# Patient Record
Sex: MALE | Race: WHITE | NOT HISPANIC OR LATINO | Employment: OTHER | URBAN - METROPOLITAN AREA
[De-identification: names, ages, dates, MRNs, and addresses within clinical notes are randomized per-mention and may not be internally consistent; named-entity substitution may affect disease eponyms.]

---

## 2017-04-19 ENCOUNTER — HISTORICAL (OUTPATIENT)
Dept: LAB | Facility: HOSPITAL | Age: 82
End: 2017-04-19

## 2017-04-20 ENCOUNTER — HISTORICAL (OUTPATIENT)
Dept: RADIATION THERAPY | Facility: HOSPITAL | Age: 82
End: 2017-04-20

## 2017-04-25 ENCOUNTER — HISTORICAL (OUTPATIENT)
Dept: ADMINISTRATIVE | Facility: HOSPITAL | Age: 82
End: 2017-04-25

## 2017-05-23 ENCOUNTER — HISTORICAL (OUTPATIENT)
Dept: LAB | Facility: HOSPITAL | Age: 82
End: 2017-05-23

## 2017-05-23 LAB
ABS NEUT (OLG): 3.45 X10(3)/MCL (ref 2.1–9.2)
APPEARANCE, UA: CLEAR
APTT PPP: 34.5 SECOND(S) (ref 20.6–36)
BACTERIA SPEC CULT: NORMAL /HPF
BASOPHILS # BLD AUTO: 0 X10(3)/MCL (ref 0–0.2)
BASOPHILS NFR BLD AUTO: 0 %
BILIRUB UR QL STRIP: NEGATIVE
BUN SERPL-MCNC: 14 MG/DL (ref 7–18)
CALCIUM SERPL-MCNC: 8.7 MG/DL (ref 8.5–10.1)
CHLORIDE SERPL-SCNC: 104 MMOL/L (ref 98–107)
CO2 SERPL-SCNC: 29 MMOL/L (ref 21–32)
COLOR UR: YELLOW
CREAT SERPL-MCNC: 1.09 MG/DL (ref 0.7–1.3)
EOSINOPHIL # BLD AUTO: 0.3 X10(3)/MCL (ref 0–0.9)
EOSINOPHIL NFR BLD AUTO: 5 %
ERYTHROCYTE [DISTWIDTH] IN BLOOD BY AUTOMATED COUNT: 13.1 % (ref 11.5–17)
GLUCOSE (UA): NEGATIVE
GLUCOSE SERPL-MCNC: 93 MG/DL (ref 74–106)
HCT VFR BLD AUTO: 42.9 % (ref 42–52)
HGB BLD-MCNC: 14.2 GM/DL (ref 14–18)
HGB UR QL STRIP: NEGATIVE
INR PPP: 1.14 (ref 0–1.27)
KETONES UR QL STRIP: NEGATIVE
LEUKOCYTE ESTERASE UR QL STRIP: NEGATIVE
LYMPHOCYTES # BLD AUTO: 1.5 X10(3)/MCL (ref 0.6–4.6)
LYMPHOCYTES NFR BLD AUTO: 25 %
MCH RBC QN AUTO: 31.3 PG (ref 27–31)
MCHC RBC AUTO-ENTMCNC: 33.1 GM/DL (ref 33–36)
MCV RBC AUTO: 94.7 FL (ref 80–94)
MONOCYTES # BLD AUTO: 0.7 X10(3)/MCL (ref 0.1–1.3)
MONOCYTES NFR BLD AUTO: 12 %
MRSA SCREEN BY PCR: NEGATIVE
NEUTROPHILS # BLD AUTO: 3.45 X10(3)/MCL (ref 2.1–9.2)
NEUTROPHILS NFR BLD AUTO: 58 %
NITRITE UR QL STRIP: NEGATIVE
PH UR STRIP: 5 [PH] (ref 5–9)
PLATELET # BLD AUTO: 134 X10(3)/MCL (ref 130–400)
PMV BLD AUTO: 11.4 FL (ref 9.4–12.4)
POTASSIUM SERPL-SCNC: 4.8 MMOL/L (ref 3.5–5.1)
PROT UR QL STRIP: NEGATIVE
PROTHROMBIN TIME: 14.4 SECOND(S) (ref 12.1–14.2)
RBC # BLD AUTO: 4.53 X10(6)/MCL (ref 4.7–6.1)
RBC #/AREA URNS HPF: NORMAL /[HPF]
SODIUM SERPL-SCNC: 141 MMOL/L (ref 136–145)
SP GR UR STRIP: 1.01 (ref 1–1.03)
SQUAMOUS EPITHELIAL, UA: NORMAL
UROBILINOGEN UR STRIP-ACNC: 0.2
WBC # SPEC AUTO: 6 X10(3)/MCL (ref 4.5–11.5)
WBC #/AREA URNS HPF: NORMAL /HPF

## 2017-06-09 ENCOUNTER — HISTORICAL (OUTPATIENT)
Dept: ADMINISTRATIVE | Facility: HOSPITAL | Age: 82
End: 2017-06-09

## 2020-02-13 ENCOUNTER — HOSPITAL ENCOUNTER (OUTPATIENT)
Dept: MEDSURG UNIT | Facility: HOSPITAL | Age: 85
End: 2020-02-14
Attending: INTERNAL MEDICINE | Admitting: INTERNAL MEDICINE

## 2020-02-13 LAB
ABS NEUT (OLG): 4.96 X10(3)/MCL (ref 2.1–9.2)
ALBUMIN SERPL-MCNC: 3.2 GM/DL (ref 3.4–5)
ALBUMIN/GLOB SERPL: 1 RATIO (ref 1.1–2)
ALP SERPL-CCNC: 137 UNIT/L (ref 50–136)
ALT SERPL-CCNC: 28 UNIT/L (ref 12–78)
APTT PPP: 33 SECOND(S) (ref 24.2–33.9)
AST SERPL-CCNC: 27 UNIT/L (ref 15–37)
BASOPHILS # BLD AUTO: 0 X10(3)/MCL (ref 0–0.2)
BASOPHILS NFR BLD AUTO: 0 %
BILIRUB SERPL-MCNC: 0.3 MG/DL (ref 0.2–1)
BILIRUBIN DIRECT+TOT PNL SERPL-MCNC: 0.1 MG/DL (ref 0–0.5)
BILIRUBIN DIRECT+TOT PNL SERPL-MCNC: 0.2 MG/DL (ref 0–0.8)
BNP BLD-MCNC: 45 PG/ML (ref 0–100)
BUN SERPL-MCNC: 25 MG/DL (ref 7–18)
CALCIUM SERPL-MCNC: 8.4 MG/DL (ref 8.5–10.1)
CHLORIDE SERPL-SCNC: 106 MMOL/L (ref 98–107)
CO2 SERPL-SCNC: 27 MMOL/L (ref 21–32)
CREAT SERPL-MCNC: 1.09 MG/DL (ref 0.7–1.3)
EOSINOPHIL # BLD AUTO: 0.3 X10(3)/MCL (ref 0–0.9)
EOSINOPHIL NFR BLD AUTO: 4 %
ERYTHROCYTE [DISTWIDTH] IN BLOOD BY AUTOMATED COUNT: 12.3 % (ref 11.5–17)
GLOBULIN SER-MCNC: 3.1 GM/DL (ref 2.4–3.5)
GLUCOSE SERPL-MCNC: 115 MG/DL (ref 74–106)
HCT VFR BLD AUTO: 39.5 % (ref 42–52)
HGB BLD-MCNC: 12.1 GM/DL (ref 14–18)
INR PPP: 1.2 (ref 0–1.3)
LYMPHOCYTES # BLD AUTO: 1.7 X10(3)/MCL (ref 0.6–4.6)
LYMPHOCYTES NFR BLD AUTO: 22 %
MCH RBC QN AUTO: 31.3 PG (ref 27–31)
MCHC RBC AUTO-ENTMCNC: 30.6 GM/DL (ref 33–36)
MCV RBC AUTO: 102.3 FL (ref 80–94)
MONOCYTES # BLD AUTO: 1 X10(3)/MCL (ref 0.1–1.3)
MONOCYTES NFR BLD AUTO: 12 %
NEUTROPHILS # BLD AUTO: 4.96 X10(3)/MCL (ref 2.1–9.2)
NEUTROPHILS NFR BLD AUTO: 62 %
PLATELET # BLD AUTO: 135 X10(3)/MCL (ref 130–400)
PMV BLD AUTO: 10.6 FL (ref 9.4–12.4)
POC TROPONIN: 0 NG/ML (ref 0–0.08)
POTASSIUM SERPL-SCNC: 5 MMOL/L (ref 3.5–5.1)
PROT SERPL-MCNC: 6.3 GM/DL (ref 6.4–8.2)
PROTHROMBIN TIME: 14.2 SECOND(S) (ref 12–14)
RBC # BLD AUTO: 3.86 X10(6)/MCL (ref 4.7–6.1)
SODIUM SERPL-SCNC: 137 MMOL/L (ref 136–145)
TROPONIN I SERPL-MCNC: <0.02 NG/ML (ref 0.02–0.49)
WBC # SPEC AUTO: 8 X10(3)/MCL (ref 4.5–11.5)

## 2020-02-14 LAB
ABS NEUT (OLG): 4.72 X10(3)/MCL (ref 2.1–9.2)
APPEARANCE, UA: CLEAR
APTT PPP: 32.9 SECOND(S) (ref 24.8–36.9)
BACTERIA SPEC CULT: NORMAL /HPF
BASOPHILS # BLD AUTO: 0 X10(3)/MCL (ref 0–0.2)
BASOPHILS NFR BLD AUTO: 0 %
BILIRUB UR QL STRIP: NEGATIVE
CHOLEST SERPL-MCNC: 118 MG/DL (ref 0–200)
CHOLEST/HDLC SERPL: 4.1 {RATIO} (ref 0–5)
CK MB SERPL-MCNC: 1 NG/ML (ref 0.5–3.6)
CK MB SERPL-MCNC: 1.1 NG/ML (ref 0.5–3.6)
CK SERPL-CCNC: 78 UNIT/L (ref 39–308)
CK SERPL-CCNC: 92 UNIT/L (ref 39–308)
COLOR UR: YELLOW
EOSINOPHIL # BLD AUTO: 0.2 X10(3)/MCL (ref 0–0.9)
EOSINOPHIL NFR BLD AUTO: 3 %
ERYTHROCYTE [DISTWIDTH] IN BLOOD BY AUTOMATED COUNT: 12.4 % (ref 11.5–17)
GLUCOSE (UA): NEGATIVE
HCT VFR BLD AUTO: 37.1 % (ref 42–52)
HDLC SERPL-MCNC: 29 MG/DL (ref 35–60)
HGB BLD-MCNC: 11.8 GM/DL (ref 14–18)
HGB UR QL STRIP: NEGATIVE
INR PPP: 1.2 (ref 0–1.3)
KETONES UR QL STRIP: NEGATIVE
LDLC SERPL CALC-MCNC: 70 MG/DL (ref 0–129)
LEUKOCYTE ESTERASE UR QL STRIP: NEGATIVE
LYMPHOCYTES # BLD AUTO: 1.6 X10(3)/MCL (ref 0.6–4.6)
LYMPHOCYTES NFR BLD AUTO: 22 %
MCH RBC QN AUTO: 31.6 PG (ref 27–31)
MCHC RBC AUTO-ENTMCNC: 31.8 GM/DL (ref 33–36)
MCV RBC AUTO: 99.5 FL (ref 80–94)
MONOCYTES # BLD AUTO: 0.8 X10(3)/MCL (ref 0.1–1.3)
MONOCYTES NFR BLD AUTO: 11 %
NEUTROPHILS # BLD AUTO: 4.72 X10(3)/MCL (ref 2.1–9.2)
NEUTROPHILS NFR BLD AUTO: 63 %
NITRITE UR QL STRIP: NEGATIVE
PH UR STRIP: 5.5 [PH] (ref 5–9)
PLATELET # BLD AUTO: 128 X10(3)/MCL (ref 130–400)
PMV BLD AUTO: 10.8 FL (ref 9.4–12.4)
PROT UR QL STRIP: NEGATIVE
PROTHROMBIN TIME: 14.2 SECOND(S) (ref 12–14)
RBC # BLD AUTO: 3.73 X10(6)/MCL (ref 4.7–6.1)
RBC #/AREA URNS HPF: NORMAL /[HPF]
SP GR UR STRIP: 1.02 (ref 1–1.03)
SQUAMOUS EPITHELIAL, UA: NORMAL
TRIGL SERPL-MCNC: 95 MG/DL (ref 30–150)
TROPONIN I SERPL-MCNC: 0.02 NG/ML (ref 0.02–0.49)
TROPONIN I SERPL-MCNC: 0.02 NG/ML (ref 0.02–0.49)
TROPONIN I SERPL-MCNC: <0.02 NG/ML (ref 0.02–0.49)
TROPONIN I SERPL-MCNC: <0.02 NG/ML (ref 0.02–0.49)
UROBILINOGEN UR STRIP-ACNC: 0.2
VLDLC SERPL CALC-MCNC: 19 MG/DL
WBC # SPEC AUTO: 7.5 X10(3)/MCL (ref 4.5–11.5)
WBC #/AREA URNS HPF: NORMAL /HPF

## 2021-03-03 LAB
ABS NEUT (OLG): 5.09 X10(3)/MCL (ref 2.1–9.2)
BASOPHILS # BLD AUTO: 0 X10(3)/MCL (ref 0–0.2)
BASOPHILS NFR BLD AUTO: 0 %
BUN SERPL-MCNC: 15.9 MG/DL (ref 8.4–25.7)
CALCIUM SERPL-MCNC: 9.3 MG/DL (ref 8.8–10)
CHLORIDE SERPL-SCNC: 104 MMOL/L (ref 98–107)
CO2 SERPL-SCNC: 30 MMOL/L (ref 23–31)
CREAT SERPL-MCNC: 0.91 MG/DL (ref 0.73–1.18)
CREAT/UREA NIT SERPL: 17
EOSINOPHIL # BLD AUTO: 0.2 X10(3)/MCL (ref 0–0.9)
EOSINOPHIL NFR BLD AUTO: 3 %
ERYTHROCYTE [DISTWIDTH] IN BLOOD BY AUTOMATED COUNT: 13.4 % (ref 11.5–17)
GLUCOSE SERPL-MCNC: 104 MG/DL (ref 82–115)
HCT VFR BLD AUTO: 41.3 % (ref 42–52)
HGB BLD-MCNC: 12.7 GM/DL (ref 14–18)
LYMPHOCYTES # BLD AUTO: 1.5 X10(3)/MCL (ref 0.6–4.6)
LYMPHOCYTES NFR BLD AUTO: 20 %
MCH RBC QN AUTO: 31 PG (ref 27–31)
MCHC RBC AUTO-ENTMCNC: 30.8 GM/DL (ref 33–36)
MCV RBC AUTO: 100.7 FL (ref 80–94)
MONOCYTES # BLD AUTO: 0.7 X10(3)/MCL (ref 0.1–1.3)
MONOCYTES NFR BLD AUTO: 10 %
NEUTROPHILS # BLD AUTO: 5.09 X10(3)/MCL (ref 2.1–9.2)
NEUTROPHILS NFR BLD AUTO: 67 %
PLATELET # BLD AUTO: 188 X10(3)/MCL (ref 130–400)
PMV BLD AUTO: 11.4 FL (ref 9.4–12.4)
POTASSIUM SERPL-SCNC: 4.6 MMOL/L (ref 3.5–5.1)
RBC # BLD AUTO: 4.1 X10(6)/MCL (ref 4.7–6.1)
SARS-COV-2 AG RESP QL IA.RAPID: NEGATIVE
SODIUM SERPL-SCNC: 140 MMOL/L (ref 136–145)
WBC # SPEC AUTO: 7.6 X10(3)/MCL (ref 4.5–11.5)

## 2021-03-04 ENCOUNTER — HISTORICAL (OUTPATIENT)
Dept: ADMINISTRATIVE | Facility: HOSPITAL | Age: 86
End: 2021-03-04

## 2022-04-10 ENCOUNTER — HISTORICAL (OUTPATIENT)
Dept: ADMINISTRATIVE | Facility: HOSPITAL | Age: 87
End: 2022-04-10

## 2022-04-26 VITALS
WEIGHT: 154.56 LBS | DIASTOLIC BLOOD PRESSURE: 70 MMHG | BODY MASS INDEX: 20.49 KG/M2 | HEIGHT: 73 IN | SYSTOLIC BLOOD PRESSURE: 132 MMHG

## 2022-04-30 NOTE — OP NOTE
Patient:   Carmelo Cornejo Sr            MRN: [id number]            FIN: [id number]               Age:   85 years     Sex:  Male     :  1935   Associated Diagnoses:   None   Author:   Yobani Quinn MD      Operative Note   DATE OF OPERATION:  2021    PREOPERATIVE DIAGNOSIS  1.  Right VIM DBS IPG end-of-life  2.  Medically intractable essential tremor    POSTOPERATIVE DIAGNOSIS:  1.  Right VIM DBS IPG end-of-life  2.  Medically intractable essential tremor    SURGEON:  Yobani Quinn M.D.   ASSISTANT: DANIELLE Foster    PROCEDURE:  1. Removal of right VIM DBS IPG  2. Insertion of right VIM DBS IPG (OneWed (Formerly [employer]tronic Activa SC)  3. Interrogation an initial programming of DBS IPG    ANESTHESIA:  General endotracheal    BLOOD LOSS:  Negligible    SPECIMEN(s):  IPG for ID only    COMPLICATIONS:  None    HISTORY:  Carmelo Cornejo is a 85-year-old gentleman who is approximately 4 years ago underwent right VIM DBS electrode implantation.  He did well with respect to his tremor control, but interrogation recently showed the IPG end-of-life.  Options were discussed and removal/replacement was elected.  The patient understood and accepted the nature of this surgery as well as its attendant risks.    FINDINGS:  There were no untoward findings.  The IPG was replaced without incident.  Interrogation confirmed normal impedances at all contacts.  The IPG was reprogrammed to the preoperative settings.    PROCEDURE IN DETAIL:  After endotracheal intubation and induction of general anesthesia, the patient's head was placed on a donut and turned away from the planned operative side. The patient received an appropriate dose of intravenous antibiotic prior to the start of procedure. The left anterior chest wall was prepped and draped in the usual fashion. The previous incision was marked out and infiltrated with local anesthetic containing epinephrine. The incision was carried down through the skin and subcutaneous  tissues with a knife. Sharp dissection was used to dissect around the existing generator and tie-down sutures. The generator was removed from the pocket and disconnected from the extension leads. The extension leads were wiped clean and then inserted into the new generator which was placed in the pocket and checked for impedances which were satisfactory. The wound was then irrigated copiously with antibiotic irrigationThe generator was secured to the fascia with a single 3-0 Prolene suture. The wound was then closed without any tension with 2-0 Vicryl for the subcutaneous layer and Dermabond glue for the skin edges. The patient was taken to the post anesthetic care unit in satisfactory condition with correct sponge and needle counts.

## 2022-04-30 NOTE — ED PROVIDER NOTES
"   Patient:   Carmelo Cornejo Sr            MRN: [id number]            FIN: [id number]               Age:   84 years     Sex:  Male     :  1935   Associated Diagnoses:   Chest pain; Unstable angina   Author:   Shahida Cullen      Basic Information   Time seen: Date & time 2020 23:27:00.   History source: Patient, significant other.   Arrival mode: Air ambulance.   History limitation: None.   Additional information: Patient's physician(s): Omari Monge MD      History of Present Illness   The patient presents with   83 y/o CM with a history of AAA, Alzheimer's, CABG, CPAP, HLD, MI, and HTN presents to the ED via [facility] for chest pain that started around . Pt's wife states that the Pt was complaining of indigestion so she went to get him something for it and when she returned the Pt was saying "I think I am having a heart attack". Pt's wife states that the Pt was pale and diaphoretic. Pt's pain was reduced with NTG..  The onset was 2020 20:10:00 .  The course/duration of symptoms is constant.  Location: Central chest epigastric. Radiating pain: none. The character of symptoms is pressure.  The degree at onset was minimal.  The degree at maximum was moderate.  The degree at present is minimal.  The exacerbating factor is none.  The relieving factor is nitroglycerin.  Risk factors consist of hypertension, smoking (Former) and hyperlipidemia.  Prior episodes: MI.  Therapy today Nitroglycerin and Aspirin.  Associated symptoms: diaphoresis.        Review of Systems   Constitutional symptoms:  Negative except as documented in HPI, Pale and diaphoretic   Skin symptoms:  Negative except as documented in HPI   Eye symptoms:  Negative except as documented in HPI.   ENMT symptoms:  Negative except as documented in HPI.   Respiratory symptoms:  Negative except as documented in HPI   Cardiovascular symptoms:  Chest pain, central, moderate pain   Gastrointestinal symptoms:  Negative except as " documented in HPI.   Musculoskeletal symptoms:  Negative except as documented in HPI.   Neurologic symptoms:  Negative except as documented in HPI.   Psychiatric symptoms:  Negative except as documented in HPI             Additional review of systems information: All other systems reviewed and otherwise negative.      Health Status   Allergies:    Allergic Reactions (All)  Low  [place]- Stomach issues.  Severity Not Documented  Carisoprodol- Insomnia.  Clemastine fumarate- Stomach pain and testicle pain.  Codeine- Hallucinations and itching.  Floxin- Insomnia.  Iodine- Rash and tiara - difficulty in breathing.  Ionic iodinated contrast media- Unknown.  Levofloxacin- Insomnia.  Morphine- Hallucinations.  Penicillin allergy- Pain radiating to lower abdomen and testicle pain.  Prednisone- Stomach pain and testicle pain.  Prevacid- Stomach pain.  PriLOSEC- Bleeding nose.  Tetracycline- Indigestion.  Vancomycin- Unknown..   Medications:  (Selected)   Inpatient Medications  Ordered  Vancomycin 1g/D5W 250 mL: 1 gm, IV Piggyback, Once, Infuse over: 120 minute(s), first dose 12/02/16 11:01:00 CST, stop date 12/02/16 11:01:00 CST, STAT, 24  citric acid-sodium citrate 334 mg-500 mg/5 mL oral solution: 30 mL, form: Soln, Oral, Once, first dose 04/25/17 9:00:00 CDT, stop date 04/25/17 9:00:00 CDT, if obese (BMI>30) or has GERD or diabetes or is not NPO  Pending Complete  midazolam: 2 mg, form: Injection, IV Push, q5min, Order duration: 2 dose(s), first dose 10/11/16 6:05:00 CDT, stop date 10/11/16 6:14:00 CDT, (up to 5 mg for moderate anxiety), 30,025  Prescriptions  Prescribed  memantine 10 mg oral tablet: 10 mg = 1 tab(s), Oral, BID, # 180 tab(s), 4 Refill(s), Pharmacy: [facility] [id number]  Documented Medications  Documented  Tylenol 325 mg oral tablet: 650 mg = 2 tab(s), Oral, q4hr, PRN PRN fever, 0 Refill(s)  aspirin 81 mg oral tablet: 81 mg = 1 tab(s), Oral, Daily, 0 Refill(s)  atorvastatin 10 mg oral tablet: 5 mg = 0.5  tab(s), Oral, qPM  carvedilol 3.125 mg oral tablet: 3.125 mg = 1 tab(s), Oral, BID  terazosin 1 mg oral capsule: 1 mg = 1 cap(s), Oral, Once a day (at bedtime)  tiZANidine 4 mg oral tablet: 4 mg = 1 tab(s), Oral, BID.      Past Medical/ Family/ Social History   Medical history:    Active  Sleep apnea ([id number])  Resolved  Atrial fibrillation ([id number]):  Resolved.  AAA (abdominal aortic aneurysm) ([id number]):  Resolved.  Diverticulitis ([id number]):  Resolved.  Inguinal hernia ([id number]):  Resolved.  Comments:  5/23/2017 CDT 15:05 HADLEY Diaz RN, Ias Marlow  bilateral  Kidney stones ([id number]):  Resolved..   Surgical history:    Craniotomy Deep Brain Stim Stealth (.) on 6/13/2017 at 82 Years.  Comments:  6/13/2017 11:35 SONALT - Kristopher WYATT, Edwina LUNA  auto-populated from documented surgical case  DBS Pulse Generator Insertion (Anterior, .) on 11/26/2016 at 81 Years.  Comments:  11/26/2016 12:18 Dawn Coyle RN  auto-populated from documented surgical case  DBS Pulse Generator Insertion (Right) on 10/25/2016 at 81 Years.  Comments:  10/25/2016 8:47 SONALT - Sharon Massey RN  auto-populated from documented surgical case  Craniotomy Deep Brain Stim Stealth (Right) on 10/14/2016 at 81 Years.  Comments:  10/14/2016 10:07 HADLEY - Shanita Odonnell RN  auto-populated from documented surgical case  Procedure Cancelled Intraoperatively on 10/11/2016 at 81 Years.  Comments:  10/11/2016 12:18 SONALT - YOSELIN Cardenas Sandy  auto-populated from documented surgical case  Fiducial Bone [place] [employer]/PACU (.) on 10/4/2016 at 81 Years.  Comments:  10/4/2016 7:57 CDT - Jt WYATT , Edwina LUNA  auto-populated from documented surgical case  CABG x 6 in 2005 at 70 Years.  Back Sx.  Hernia repair.  Cardiac stent.  right carpel tunnel.  bilateral shoulder surgeries.  AAA stent..   Family history:    Acute myocardial infarction.  Mother  Cardiac  arrhythmia.  Father  .   Social history: Alcohol use: Denies, Tobacco use: Former, Drug use: Denies.      Physical Examination               Vital Signs   Vital Signs   2/13/2020 22:30 CST      Peripheral Pulse Rate     57 bpm  LOW                             Heart Rate Monitored      57 bpm  LOW                             Respiratory Rate          17 br/min                             SpO2                      96 %                             Systolic Blood Pressure   90 mmHg                             Diastolic Blood Pressure  51 mmHg  LOW                             Mean Arterial Pressure, Cuff              64 mmHg    2/13/2020 22:15 CST      Peripheral Pulse Rate     57 bpm  LOW                             Heart Rate Monitored      58 bpm  LOW                             Respiratory Rate          12 br/min                             SpO2                      96 %                             Saturation Probe Site     Hand, left                             Oxygen Therapy            Room air                             Systolic Blood Pressure   104 mmHg                             Diastolic Blood Pressure  57 mmHg  LOW                             Mean Arterial Pressure, Cuff              73 mmHg                             Blood Pressure Location   Right arm                             Blood Pressure Cuff Size  Adult    2/13/2020 22:02 CST      Temperature Oral          37.0 DegC                             Temperature Oral (calculated)             98.60 DegF                             Peripheral Pulse Rate     59 bpm  LOW                             Respiratory Rate          16 br/min                             Systolic Blood Pressure   104 mmHg                             Diastolic Blood Pressure  57 mmHg  LOW  .   Measurements   2/13/2020 22:02 CST      Weight Dosing             77.5 kg                             Weight Measured and Calculated in Lbs     170.86 lb                             Weight  Estimated          77.5 kg  .   Basic Oxygen Information   2/13/2020 22:30 CST      SpO2                      96 %    2/13/2020 22:15 CST      SpO2                      96 %                             Oxygen Therapy            Room air  .   General:  Alert, no acute distress, well appearing, conversant   Neurological:  Alert and oriented to person, place, time, and situation, No focal neurological deficit observed, CN II-XII intact, normal sensory observed, normal motor observed, normal speech observed, normal coordination observed.    Skin:  Warm, dry, intact   Head:  Normocephalic, atraumatic   Neck:  Supple, trachea midline   Eye:  Pupils are equal, round and reactive to light, extraocular movements are intact, normal conjunctiva   Ears, nose, mouth and throat:  Oral mucosa moist.   Cardiovascular:  Regular rate and rhythm, No murmur, Normal peripheral perfusion, No edema   Respiratory:  Lungs are clear to auscultation, respirations are non-labored, breath sounds are equal, Symmetrical chest wall expansion.    Chest wall:  No tenderness.   Musculoskeletal:  Normal ROM, normal strength, no tenderness, no swelling, no deformity.    Gastrointestinal:  Soft, Nontender, Non distended   Psychiatric:  Cooperative, appropriate mood & affect, normal judgment.       Medical Decision Making   Documents reviewed:  Emergency department nurses' notes.   Orders  Launch Order Profile (Selected)   Inpatient Orders  Ordered  Troponin-I: Timed collect, 02/13/20 22:17:00 CST, Blood, q6hr for 3 dose(s), Stop date 02/14/20 16:59:00 CST, Lab Collect, Print Label By Order Location, 02/13/20 23:00:00 CST  Ordered (Collected)  POC BNP iSTAT request: BLOOD, STAT collect, Collected, 02/13/20 22:22:57 CST, Stop date 02/13/20 22:22:00 CST, Lab Collect, Print Label By Order Location  POC iSTAT ER Troponin request: BLOOD, STAT collect, Collected, 02/13/20 22:22:57 CST, Stop date 02/13/20 22:22:00 CST, Lab Collect, Print Label By Order  Location  Ordered (Dispatched)  Urinalysis with microscopic a reflex to culture: Stat collect, Urine, 02/13/20 22:18:00 CST, Stop date 02/13/20 22:18:00 CST, Nurse collect  Completed  Automated Diff: STAT collect, 02/13/20 22:22:00 CST, Blood, Collected, Stop date 02/13/20 22:22:00 CST, Lab Collect, 02/13/20 22:18:00 CST  CBC w/ Auto Diff: STAT collect, 02/13/20 22:22:57 CST, BLOOD, Collected, Stop date 02/13/20 22:22:00 CST, Lab Collect  CMP: STAT collect, 02/13/20 22:22:57 CST, BLOOD, Collected, Stop date 02/13/20 22:22:00 CST, Lab Collect  CXR 1 View: Stat, 02/13/20 22:17:00 CST, Chest Pain, None, Patient Bed, Patient Has IV?, Rad Type, Not Scheduled, 02/13/20 22:17:00 CST  Estimated Glomerular Filtration Rate: STAT collect, 02/13/20 22:22:00 CST, Blood, Collected, Stop date 02/13/20 22:22:00 CST, Lab Collect, 02/13/20 22:18:00 CST  POC BNP iSTAT: Blood, Stat collect, Collected, 02/13/20 22:44:36 CST  POC Istat ER Troponin: Blood, Stat collect, Collected, 02/13/20 22:43:32 CST  PTT: STAT collect, 02/13/20 22:22:57 CST, BLOOD, Collected, Stop date 02/13/20 22:22:00 CST, Lab Collect  Prothrombin Time: STAT collect, 02/13/20 22:22:57 CST, BLOOD, Collected, Stop date 02/13/20 22:22:00 CST, Lab Collect  aspirin 325 mg oral tablet: 325 mg, form: Tab, Oral, Once, first dose 02/13/20 23:35:00 CST, stop date 02/13/20 23:35:00 CST, STAT, 4 chew tab = 5 grains  aspirin: 325 mg, 1 tab(s), Tab, N/A, Once, Stop date 02/13/20 23:33:23 CST, Physician Stop, 02/13/20 23:33:23 CST  nitroglycerin 2% transdermal ointment: 1 in, form: Ointment, TOP, Once, first dose 02/13/20 22:18:00 CST, stop date 02/13/20 22:18:00 CST, STAT.   Electrocardiogram:  Time 2/13/2020 22:03:00, rate 58, normal sinus rhythm, No ST-T changes, no ectopy, EP Interp, QRS interval Right bundle branch block.    Results review:  Lab results : Lab View   2/13/2020 22:44 CST      POC BNP iSTAT             45 pg/mL    2/13/2020 22:43 CST      POC Troponin               0.00 ng/mL    2/13/2020 22:22 CST      Sodium Lvl                137 mmol/L                             Potassium Lvl             5.0 mmol/L                             Chloride                  106 mmol/L                             CO2                       27.0 mmol/L                             Calcium Lvl               8.4 mg/dL  LOW                             Glucose Lvl               115 mg/dL  HI                             BUN                       25.0 mg/dL  HI                             Creatinine                1.09 mg/dL                             eGFR-AA                   >60 mL/min/1.73 m2  NA                             eGFR-LALO                  >60 mL/min/1.73 m2  NA                             Bili Total                0.3 mg/dL                             Bili Direct               0.10 mg/dL                             Bili Indirect             0.20 mg/dL                             AST                       27 unit/L                             ALT                       28 unit/L                             Alk Phos                  137 unit/L  HI                             Total Protein             6.3 gm/dL  LOW                             Albumin Lvl               3.20 gm/dL  LOW                             Globulin                  3.10 gm/dL                             A/G Ratio                 1.0 ratio  LOW                             Troponin-I                <0.02 ng/mL  LOW                             PT                        14.2 second(s)  HI                             INR                       1.2                             PTT                       33.0 second(s)                             WBC                       8.0 x10(3)/mcL                             RBC                       3.86 x10(6)/mcL  LOW                             Hgb                       12.1 gm/dL  LOW                             Hct                       39.5 %  LOW                             Platelet                   135 x10(3)/mcL                             MCV                       102.3 fL  HI                             MCH                       31.3 pg  HI                             MCHC                      30.6 gm/dL  LOW                             RDW                       12.3 %                             MPV                       10.6 fL                             Abs Neut                  4.96 x10(3)/mcL                             Neutro Auto               62 %  NA                             Lymph Auto                22 %  NA                             Mono Auto                 12 %  NA                             Eos Auto                  4 %  NA                             Abs Eos                   0.3 x10(3)/mcL                             Basophil Auto             0 %  NA                             Abs Neutro                4.96 x10(3)/mcL                             Abs Lymph                 1.7 x10(3)/mcL                             Abs Mono                  1.0 x10(3)/mcL                             Abs Baso                  0.0 x10(3)/mcL    .   Radiology results:  Rad Results (ST)  < 12 hrs   Accession: [id number]  Order: XR Chest 1 View  Report Dt/Tm: 02/13/2020 22:46  Report:   Chest one view     Clinical history is chest pain     This compared to a previous study from 4/19/2017     There is mild blunting of the left costophrenic angle stable from the  previous exam. Pacing battery is in place. Heart size is within normal  limits. There is vascular calcification noted.     IMPRESSION: Changes as described above, stable from the previous study    .      Reexamination/ Reevaluation   Vital signs   Basic Oxygen Information   2/13/2020 22:30 CST      SpO2                      96 %    2/13/2020 22:15 CST      SpO2                      96 %                             Oxygen Therapy            Room air        Impression and Plan   Diagnosis   Chest pain ([id number] R07.9)   Unstable  angina (INF76-VH I20.0)      Calls-Consults   -  2/13/2020 23:47:00 , consult, recommends Rhys with [facility] recommends admit.    Plan   Condition: Stable.    Disposition: Admit.    Counseled: Patient, Family, Regarding diagnosis, Regarding diagnostic results, Regarding treatment plan, Patient indicated understanding of instructions, Family understood.    Notes: I, Shahida Cullen, acted solely as a scribe for and in the presence of Dr. Lorenzo who performed the service. .       Addendum      Teaching-Supervisory Addendum-Brief   Notes: I, Dr. Lorenzo, personally performed the services described in this documentation as scribed in my presence and it is both accurate and complete..

## 2022-04-30 NOTE — H&P
Patient:   Carmelo Cornejo Sr            MRN: [id number]            FIN: [id number]               Age:   85 years     Sex:  Male     :  1935   Associated Diagnoses:   None   Author:   Damian ROTH, Judy Jones      Health Status   The H&P was reviewed, the patient was examined, and there are no changes to the patient's condition..

## 2022-06-14 ENCOUNTER — HOSPITAL ENCOUNTER (EMERGENCY)
Facility: HOSPITAL | Age: 87
Discharge: HOME | End: 2022-06-14
Attending: EMERGENCY MEDICINE
Payer: COMMERCIAL

## 2022-06-14 VITALS
SYSTOLIC BLOOD PRESSURE: 107 MMHG | HEIGHT: 74 IN | OXYGEN SATURATION: 94 % | DIASTOLIC BLOOD PRESSURE: 58 MMHG | BODY MASS INDEX: 21.82 KG/M2 | RESPIRATION RATE: 12 BRPM | WEIGHT: 170 LBS | HEART RATE: 99 BPM | TEMPERATURE: 99 F

## 2022-06-14 DIAGNOSIS — K81.9 CHOLECYSTITIS: ICD-10-CM

## 2022-06-14 DIAGNOSIS — R10.11 RUQ PAIN: Primary | ICD-10-CM

## 2022-06-14 LAB
ALBUMIN SERPL-MCNC: 3 GM/DL (ref 3.4–4.8)
ALBUMIN/GLOB SERPL: 0.8 RATIO (ref 1.1–2)
ALP SERPL-CCNC: 139 UNIT/L (ref 40–150)
ALT SERPL-CCNC: 22 UNIT/L (ref 0–55)
AST SERPL-CCNC: 41 UNIT/L (ref 5–34)
BASOPHILS # BLD AUTO: 0.03 X10(3)/MCL (ref 0–0.2)
BASOPHILS NFR BLD AUTO: 0.3 %
BILIRUBIN DIRECT+TOT PNL SERPL-MCNC: 0.2 MG/DL (ref 0–0.5)
BILIRUBIN DIRECT+TOT PNL SERPL-MCNC: 0.8 MG/DL
BUN SERPL-MCNC: 29.2 MG/DL (ref 8.4–25.7)
CALCIUM SERPL-MCNC: 8.6 MG/DL (ref 8.8–10)
CHLORIDE SERPL-SCNC: 104 MMOL/L (ref 98–107)
CO2 SERPL-SCNC: 24 MMOL/L (ref 23–31)
CREAT SERPL-MCNC: 1.28 MG/DL (ref 0.73–1.18)
EOSINOPHIL # BLD AUTO: 0.17 X10(3)/MCL (ref 0–0.9)
EOSINOPHIL NFR BLD AUTO: 1.6 %
ERYTHROCYTE [DISTWIDTH] IN BLOOD BY AUTOMATED COUNT: 13.8 % (ref 11.5–17)
GLOBULIN SER-MCNC: 3.8 GM/DL (ref 2.4–3.5)
GLUCOSE SERPL-MCNC: 125 MG/DL (ref 82–115)
HCT VFR BLD AUTO: 42.9 % (ref 42–52)
HGB BLD-MCNC: 14.1 GM/DL (ref 14–18)
IMM GRANULOCYTES # BLD AUTO: 0.08 X10(3)/MCL (ref 0–0.02)
IMM GRANULOCYTES NFR BLD AUTO: 0.8 % (ref 0–0.43)
LIPASE SERPL-CCNC: 10 U/L
LYMPHOCYTES # BLD AUTO: 1.42 X10(3)/MCL (ref 0.6–4.6)
LYMPHOCYTES NFR BLD AUTO: 13.7 %
MCH RBC QN AUTO: 32 PG (ref 27–31)
MCHC RBC AUTO-ENTMCNC: 32.9 MG/DL (ref 33–36)
MCV RBC AUTO: 97.5 FL (ref 80–94)
MONOCYTES # BLD AUTO: 0.99 X10(3)/MCL (ref 0.1–1.3)
MONOCYTES NFR BLD AUTO: 9.6 %
NEUTROPHILS # BLD AUTO: 7.7 X10(3)/MCL (ref 2.1–9.2)
NEUTROPHILS NFR BLD AUTO: 74 %
NRBC BLD AUTO-RTO: 0 %
PLATELET # BLD AUTO: 181 X10(3)/MCL (ref 130–400)
PMV BLD AUTO: 13.3 FL (ref 9.4–12.4)
POTASSIUM SERPL-SCNC: 4.4 MMOL/L (ref 3.5–5.1)
PROT SERPL-MCNC: 6.8 GM/DL (ref 5.8–7.6)
RBC # BLD AUTO: 4.4 X10(6)/MCL (ref 4.7–6.1)
SODIUM SERPL-SCNC: 137 MMOL/L (ref 136–145)
TROPONIN I SERPL-MCNC: <0.01 NG/ML (ref 0–0.04)
WBC # SPEC AUTO: 10.3 X10(3)/MCL (ref 4.5–11.5)

## 2022-06-14 PROCEDURE — 36415 COLL VENOUS BLD VENIPUNCTURE: CPT | Performed by: PHYSICIAN ASSISTANT

## 2022-06-14 PROCEDURE — 93005 ELECTROCARDIOGRAM TRACING: CPT

## 2022-06-14 PROCEDURE — 85025 COMPLETE CBC W/AUTO DIFF WBC: CPT | Performed by: PHYSICIAN ASSISTANT

## 2022-06-14 PROCEDURE — 82248 BILIRUBIN DIRECT: CPT | Performed by: PHYSICIAN ASSISTANT

## 2022-06-14 PROCEDURE — 99284 EMERGENCY DEPT VISIT MOD MDM: CPT | Mod: 25

## 2022-06-14 PROCEDURE — 83690 ASSAY OF LIPASE: CPT | Performed by: PHYSICIAN ASSISTANT

## 2022-06-14 PROCEDURE — 84484 ASSAY OF TROPONIN QUANT: CPT | Performed by: PHYSICIAN ASSISTANT

## 2022-06-14 PROCEDURE — 80053 COMPREHEN METABOLIC PANEL: CPT | Performed by: PHYSICIAN ASSISTANT

## 2022-06-14 RX ORDER — HYDROCODONE BITARTRATE AND ACETAMINOPHEN 5; 325 MG/1; MG/1
1 TABLET ORAL EVERY 12 HOURS PRN
Qty: 10 TABLET | Refills: 0 | Status: SHIPPED | OUTPATIENT
Start: 2022-06-14 | End: 2022-06-19

## 2022-06-14 NOTE — FIRST PROVIDER EVALUATION
"Medical screening exam completed.  I have conducted a focused provider triage encounter, findings are as follows:    Brief history of present illness:  87-year-old male presents to ED for worsening right side pain over the last 4 days.  Patient diagnosed with acute cholecystitis and AAA of 5 cm on 06/10/2022.  Patient transferred to [facility] where was not cleared for surgery and discharged to follow-up with cardiology.  Patient states pain is not improved.    Vitals:    06/14/22 1313   BP: 135/82   BP Location: Left arm   Patient Position: Sitting   Pulse: 102   Resp: 16   Temp: 99.1 °F (37.3 °C)   TempSrc: Oral   SpO2: (!) 94%   Weight: 77.1 kg (170 lb)   Height: 6' 2" (1.88 m)       Pertinent physical exam:  Patient awake, alert and oriented.     Brief workup plan:  Labs, EKG, and CXR    Preliminary workup initiated; this workup will be continued and followed by the physician or advanced practice provider that is assigned to the patient when roomed.  "

## 2022-06-14 NOTE — ED PROVIDER NOTES
Encounter Date: 6/14/2022       History     Chief Complaint   Patient presents with    Abdominal Pain    Shoulder Pain     Pt presents c/o right shoulder and RUQ abd pain. Onset Friday / seen at [facility] aortic aneursym.       HPI   86yo M presents to the ED complaining of right sided abdominal pain. Patient has Hx of Alzheimer's disease so Hx provided by wife. She reported that 5 days ago he woke up complaining of severe abd pain. Pain crampy in nature, not associated with food, 8/10 in severity. Has some nausea and vomiting on 06/10/2022. They went to the ED in [place] and was found to have cholecystitis, he was transferred to hospital in [place], [place] for a cholecystectomy, however he was found to have an enlarged AAA on CT scan. Wife reports that AAA was previously operated, scan on 06/10/2022 showed AAA has increased in size since previous scan. Patient was not able to have cholecystectomy, was advised that CT surgeon Dr. Mondragon would have to clear him for surgery first. Took Norco this morning for pain. Currently pain free with no associated symptoms.    Review of patient's allergies indicates:   Allergen Reactions    Iodine and iodide containing products Shortness Of Breath and Rash    Clemastine      Pain in testicles and lower stomach    Codeine Hallucinations    Floxin [ofloxacin]      insomnia    Levofloxacin      insomnia    Opioids - morphine analogues Hallucinations    Penicillins      Pain in testicles and abdomen      Prednisone      Pain in testicles and lower stomach    Prilosec [omeprazole]      Pain in lower back/stomach, nose bleed    Tetracyclines      indigestion     No past medical history on file.  No past surgical history on file.  No family history on file.     Review of Systems   Constitutional: Negative for chills, diaphoresis and fever.   HENT: Negative for trouble swallowing.    Respiratory: Negative for cough and shortness of breath.    Cardiovascular:  Negative for chest pain and palpitations.   Gastrointestinal: Positive for abdominal pain, nausea and vomiting. Negative for blood in stool, constipation and diarrhea.   Genitourinary: Negative for dysuria, frequency and urgency.   Musculoskeletal: Negative for arthralgias and myalgias.   Skin: Negative for pallor.   Neurological: Negative for dizziness, weakness and light-headedness.   All other systems reviewed and are negative.      Physical Exam     Initial Vitals [06/14/22 1313]   BP Pulse Resp Temp SpO2   135/82 102 16 99.1 °F (37.3 °C) (!) 94 %      MAP       --         Physical Exam    Constitutional: He appears well-developed and well-nourished. No distress.   HENT:   Head: Normocephalic and atraumatic.   Right Ear: External ear normal.   Left Ear: External ear normal.   Eyes: EOM are normal. Pupils are equal, round, and reactive to light.   Neck: Neck supple. No thyromegaly present. No JVD present.   Cardiovascular: Normal rate, regular rhythm, normal heart sounds and intact distal pulses.   Pulmonary/Chest: Breath sounds normal. No respiratory distress. He exhibits no tenderness.   Abdominal: Bowel sounds are normal. He exhibits no distension.   Tenderness to palpation in RUQ, rigid abdomen   Musculoskeletal:      Cervical back: Neck supple.      Comments: Pain in R shoulder with deep palpation and passive adduction     Lymphadenopathy:     He has no cervical adenopathy.   Neurological: He is alert. He has normal strength.   Skin: Skin is warm and dry.         ED Course   Procedures  Labs Reviewed   COMPREHENSIVE METABOLIC PANEL - Abnormal; Notable for the following components:       Result Value    Glucose Level 125 (*)     Blood Urea Nitrogen 29.2 (*)     Creatinine 1.28 (*)     Calcium Level Total 8.6 (*)     Albumin Level 3.0 (*)     Globulin 3.8 (*)     Albumin/Globulin Ratio 0.8 (*)     Aspartate Aminotransferase 41 (*)     All other components within normal limits   CBC WITH DIFFERENTIAL -  Abnormal; Notable for the following components:    RBC 4.40 (*)     MCV 97.5 (*)     MCH 32.0 (*)     MCHC 32.9 (*)     MPV 13.3 (*)     IG# 0.08 (*)     IG% 0.8 (*)     All other components within normal limits   LIPASE - Normal   BILIRUBIN, DIRECT - Normal   TROPONIN I - Normal   CBC W/ AUTO DIFFERENTIAL    Narrative:     The following orders were created for panel order CBC W/ AUTO DIFFERENTIAL.  Procedure                               Abnormality         Status                     ---------                               -----------         ------                     CBC with Differential[[id number]]        Abnormal            Final result                 Please view results for these tests on the individual orders.   URINALYSIS, REFLEX TO URINE CULTURE     EKG Readings: (Independently Interpreted)   Initial Reading: No STEMI.   Performed at 13:20, normal sinus rhythm, ventricular rate 103, normal axis, T wave inversions in V2, V3       Imaging Results    None          Medications - No data to display  Medical Decision Making:   Initial Assessment:   88yo M with Hx of AA with previous operation reported to ED in [place] complaining of severe abd pain, was transferred to hospital in [place] where he was told he needs CT clearance from CT surgery for the cholecystectomy. Wife brought him here today for further evaluation.  Differential Diagnosis:   Acute Cholecystitis, AAA  ED Management:  Labs were within normal limits in the ED, patient pain free, but does have tenderness to palpation. Wife reports they have appointment with CT surgeon Dr. Mondragon on 06/16/2022, as long as he remains stable they would like to go home today. Patient has pain medication at home and continues to deny pain or nausea. Will discharge with pain medication and advised patient to use MiraLax every other day while using the pain medication. Patient in stable condition on discharge.            Attending Attestation:   Physician Attestation  Statement for Resident:  As the supervising MD  -: I, Dr Jack, saw this patient with the midlevel provider. I had face to face time with patient. I performed an independent history and physical examination and was involved in significant portion of medical decision making.       87-year-old male presents to the ED is 1 get touch with their vascular surgeon who repaired AAA transarterial early previously.  He was seen at an outside emergency department on the 10th, 4 days ago because he was having right upper quadrant pain and was diagnosed with cholelithiasis.  They deferred surgery until evaluation of the AAA that had increased in size.  Patient denies any abdominal pain and has been feeling fine that they just had not yet gotten to further surgeons the came here to try to get in touch with them.  While they were waiting in the ED Dr. Shin called and gave him an appointment for Thursday of this week.  I discussed with them his recent symptoms.  He has some tenderness palpation in the right upper quadrant he states it does not hurt but he grimaces a bit on exam.  Explained that the condition does have risk of developing into an infection which does have the potential to be life threatening.  It is not a fever it does not have leukocytosis AST ALT alk-phos all look good.  There is no evidence of infection at this time.  I discussed with him that we could repeat an ultrasound with a strongly want to go home and wants to go home right now.  I discussed return precautions specifically if he develops any fever over 100.4 nausea vomiting or develops pain they need to return right away and they expressed understanding.  He states he was given dicyclomine for occasional episodes of pain it is not causing any relief.  Discussed trying a low-dose Norco for a few days and would like to take MiraLax daily or at least every other day while on this medication and again wife expressed understanding.                          Clinical Impression:   Final diagnoses:  [R10.11] RUQ pain (Primary)  [K81.9] Cholecystitis          ED Disposition Condition    Discharge Stable        ED Prescriptions     Medication Sig Dispense Start Date End Date Auth. Provider    HYDROcodone-acetaminophen (NORCO) 5-325 mg per tablet Take 1 tablet by mouth every 12 (twelve) hours as needed for Pain. 10 tablet 6/14/2022 6/19/2022 Anum Lott MD        Follow-up Information     Follow up With Specialties Details Why Contact Info    Jasbir Pond MD Family Medicine In 1 week  [address] [facility] [address]  [phone]      [facility] - Emergency Dept Emergency Medicine In 1 week  [address] [facility] [address] [id number] [phone]    Binh Shin MD Cardiothoracic Surgery In 2 days  [address] [facility] [address]  [phone]             Anum Lott MD  Resident  06/14/22 1739       Issac Jack MD  06/14/22 2013

## 2022-06-14 NOTE — ED TRIAGE NOTES
Patient identifiers verified and correct for Mr. Carmelo Cornejo. Pt arrives to ED with reports of abdominal pain in RUQ. Pt reports being at [facility] on Friday, dx with gallstones. Pt pmh of aortic aneurysm. Reports aneurysm is worsened, and no one will do procedure for gallstones without aneurysm being addressed. Pt denies any pain at this time.  LOC: The patient is awake, alert and aware of environment with an appropriate affect, the patient is oriented x 4. Pt hx of dementia.  APPEARANCE: Patient appears comfortable and in no acute distress, patient is clean and well groomed.  SKIN: The skin is warm and dry, color consistent with ethnicity, patient has normal skin turgor and moist mucus membranes, skin intact, no breakdown or bruising noted.   MUSCULOSKELETAL: Patient moving all extremities spontaneously, no swelling noted.  RESPIRATORY: Airway is open and patent, respirations are spontaneous, patient has a normal effort and rate, no accessory muscle use noted, pt placed on continuous pulse ox with O2 sats noted at 94% on room air.  CARDIAC: Pt placed on cardiac monitor. Patient has a normal rate and regular rhythm, no edema noted, capillary refill < 3 seconds.   GASTRO: Soft and non tender to palpation, no distention noted, normoactive bowel sounds present in all four quadrants. Pt states bowel movements have been regular. States abdominal pain comes and goes. Reports the pain to be in the RUQ.   : Pt denies any pain or frequency with urination.  NEURO: Pt opens eyes spontaneously, behavior appropriate to situation, follows commands, facial expression symmetrical, bilateral hand grasp equal and even, purposeful motor response noted, normal sensation in all extremities when touched with a finger.

## 2022-08-19 ENCOUNTER — TELEPHONE (OUTPATIENT)
Dept: NEUROLOGY | Facility: CLINIC | Age: 87
End: 2022-08-19
Payer: COMMERCIAL

## 2022-08-19 DIAGNOSIS — R41.3 MEMORY LOSS: Primary | ICD-10-CM

## 2022-08-19 RX ORDER — MEMANTINE HYDROCHLORIDE 10 MG/1
10 TABLET ORAL 2 TIMES DAILY
Qty: 60 TABLET | Refills: 0 | Status: SHIPPED | OUTPATIENT
Start: 2022-08-19

## 2022-08-19 RX ORDER — MEMANTINE HYDROCHLORIDE 10 MG/1
10 TABLET ORAL 2 TIMES DAILY
COMMUNITY
Start: 2022-08-12 | End: 2022-08-19 | Stop reason: SDUPTHER

## 2022-08-19 NOTE — TELEPHONE ENCOUNTER
States pt needs a refill on this medication. Reports pt is out and they provided 3 day emergency supply.   Called pt to notify appt needed due to almost 2 years since last visit. No answer. Left VM asking for a callback.      Medication: Mamantine 10 mg    Pharmacy: [facility] / [place]    Last Appointment: 09/28/20    Next Appointment: none

## 2022-08-19 NOTE — TELEPHONE ENCOUNTER
Pt has [employer] (if the current documentation is correct).  If that's the case (since we do not accept [employer]), I can send 30d supply to his pharmacy & he can have his PCP take over refills.      Dr. Pond is already filling his aricept.

## 2022-10-18 ENCOUNTER — TELEPHONE (OUTPATIENT)
Dept: NEUROLOGY | Facility: CLINIC | Age: 87
End: 2022-10-18
Payer: COMMERCIAL

## 2022-10-18 NOTE — TELEPHONE ENCOUNTER
Rtn call re memantine rf and missed appts.    Advised last seen 09/2020  Missed appts 09/2021, 09/07/2022; Cx'd 10/17/22    States was unaware it had been that long.  Confirmed Dr Pond is PCP.   Advised Dr Fry last rx'd Aricept for Mr Rhodes and it is similar treament course.   Suggest that they contact their office and ask if they might assume memantine rx.   If pt is current and they accept his insurance, it may be easier for pt and family.  Verbalized understanding.

## 2022-10-18 NOTE — TELEPHONE ENCOUNTER
States pt is out of medication and needs a refill.   Notified an appt is needed due to pt not being seen since 2020.  Miriam states pt had an appt yesterday 10/17/22, but appt was canceled due to clinic not accepting his insurance.   States wcb to schedule appt once insurance is changed.   States if not refilled seeking a callback.     Medication: Memantine 10 mg     Pharmacy: [facility] / [place]     Last Appointment: 09/28/20     Next Appointment: none

## 2024-10-17 ENCOUNTER — TELEPHONE (OUTPATIENT)
Dept: NEUROLOGY | Facility: CLINIC | Age: 89
End: 2024-10-17
Payer: COMMERCIAL

## 2024-10-17 NOTE — TELEPHONE ENCOUNTER
----- Message from Nurse Yanely sent at 10/17/2024  2:32 PM CDT -----  Regarding: [facility] CLINICAL MESSAGE  u 17-Oct-24 11:46a TAKEN     To:          Office  From:        Nancy Florence  Phone:      [phone]  Patient:    Carmelo Cornejo  :         [birth year]  RegDr:      Dr Wesly Palacios  Ref:         Would like to know if the office can tell her what year exactly was the patient diagnosed with alzheimer. Please call       Subject:         Patient Calls

## 2024-10-23 ENCOUNTER — TELEPHONE (OUTPATIENT)
Dept: NEUROLOGY | Facility: CLINIC | Age: 89
End: 2024-10-23
Payer: COMMERCIAL